# Patient Record
Sex: FEMALE | Race: OTHER | HISPANIC OR LATINO | URBAN - METROPOLITAN AREA
[De-identification: names, ages, dates, MRNs, and addresses within clinical notes are randomized per-mention and may not be internally consistent; named-entity substitution may affect disease eponyms.]

---

## 2023-10-09 ENCOUNTER — EMERGENCY (EMERGENCY)
Facility: HOSPITAL | Age: 28
LOS: 1 days | Discharge: HOME | End: 2023-10-09
Attending: EMERGENCY MEDICINE
Payer: MEDICAID

## 2023-10-09 VITALS
SYSTOLIC BLOOD PRESSURE: 113 MMHG | TEMPERATURE: 98 F | WEIGHT: 165.35 LBS | HEART RATE: 77 BPM | RESPIRATION RATE: 16 BRPM | HEIGHT: 63.78 IN | DIASTOLIC BLOOD PRESSURE: 72 MMHG | OXYGEN SATURATION: 99 %

## 2023-10-09 VITALS
OXYGEN SATURATION: 97 % | RESPIRATION RATE: 16 BRPM | DIASTOLIC BLOOD PRESSURE: 73 MMHG | SYSTOLIC BLOOD PRESSURE: 114 MMHG | HEART RATE: 63 BPM

## 2023-10-09 PROCEDURE — 72100 X-RAY EXAM L-S SPINE 2/3 VWS: CPT | Mod: 26

## 2023-10-09 PROCEDURE — T1013: CPT

## 2023-10-09 PROCEDURE — 99284 EMERGENCY DEPT VISIT MOD MDM: CPT

## 2023-10-09 PROCEDURE — 72100 X-RAY EXAM L-S SPINE 2/3 VWS: CPT

## 2023-10-09 PROCEDURE — 99283 EMERGENCY DEPT VISIT LOW MDM: CPT | Mod: 25

## 2023-10-09 RX ORDER — LIDOCAINE 4 G/100G
1 CREAM TOPICAL ONCE
Refills: 0 | Status: COMPLETED | OUTPATIENT
Start: 2023-10-09 | End: 2023-10-09

## 2023-10-09 RX ORDER — ACETAMINOPHEN 500 MG
975 TABLET ORAL ONCE
Refills: 0 | Status: COMPLETED | OUTPATIENT
Start: 2023-10-09 | End: 2023-10-09

## 2023-10-09 RX ORDER — IBUPROFEN 200 MG
600 TABLET ORAL ONCE
Refills: 0 | Status: COMPLETED | OUTPATIENT
Start: 2023-10-09 | End: 2023-10-09

## 2023-10-09 RX ADMIN — Medication 600 MILLIGRAM(S): at 17:23

## 2023-10-09 RX ADMIN — LIDOCAINE 1 PATCH: 4 CREAM TOPICAL at 18:00

## 2023-10-09 RX ADMIN — LIDOCAINE 1 PATCH: 4 CREAM TOPICAL at 17:24

## 2023-10-09 RX ADMIN — Medication 975 MILLIGRAM(S): at 17:23

## 2023-10-09 NOTE — ED ADULT TRIAGE NOTE - CHIEF COMPLAINT QUOTE
B/L lower back/hip pain x 3 days and numbness/tingling x 4 days in B/L LE, worse on R. side that's aggravated by movement, causing difficulty ambulating and standing

## 2023-10-09 NOTE — ED PROVIDER NOTE - NSFOLLOWUPINSTRUCTIONS_ED_ALL_ED_FT
You were seen here for back pain.    Take Motrin 600 mg every 6 hours for pain. You can take this with Tylenol 650 mg every 4-6 hours for pain. These can be taken together.    Use Lidocaine patches (4%) which can be found at the pharmacy for 12 hours at a time.     Seek immediate medical care:   -numbness/tingling  -peeing or pooping yourself or difficulty peeing/pooping that is new  -if you can't walk despite pain medication   OR IF YOU HAVE ANY NEW/WORSENING CONCERNS.    You will receive a call from a member of our team in the next 24-48 hours to schedule an appointment with the spine center. If you do not hear from them, call the number below.

## 2023-10-09 NOTE — ED PROVIDER NOTE - PHYSICAL EXAMINATION
PE:  Gen: NAD  Head: NCAT  ENT: MMM, Normal conjunctiva  Chest: RRR, normal perfusion, no LE edema   Lungs: Symmetrical chest rise, lungs CTAB  Abdomen: soft, NTND, No rebound/guarding  Ext: No gross deformities  Neuro: awake and alert, 5/5 strength of bilateral lower extremities however pain with flexion of R hip.   MSK: negative SLR  Skin: no rashes

## 2023-10-09 NOTE — ED ADULT NURSE NOTE - OBJECTIVE STATEMENT
28 year old female pt presented to the ED co lower back pain rad down left leg x 4 days pt tool diclofenac with no relief, pt denies any trauma denies any heavy lifting pt denies any period of incontinence, pt denies any numbness or tingling pt able to ambulate but with difficulty 28 year old female pt presented to the ED co lower back pain rad down right leg x 4 days pt tool diclofenac with no relief, pt denies any trauma denies any heavy lifting pt denies any period of incontinence, pt denies any numbness or tingling pt able to ambulate but with difficulty

## 2023-10-09 NOTE — ED PROVIDER NOTE - PROGRESS NOTE DETAILS
Batsheva Gaxiola, Attending Physician: Patient ambulatory with steady gait. Return precautions including but not limited to those listed on discharge instructions were discussed at length and patient felt comfortable going home. All questions answered prior to discharge.

## 2023-10-09 NOTE — ED PROVIDER NOTE - PATIENT PORTAL LINK FT
You can access the FollowMyHealth Patient Portal offered by [facility] by registering at the following website: [email or web address]. By joining [email or web address]’s FollowMyHealth portal, you will also be able to view your health information using other applications (apps) compatible with our system.

## 2023-10-09 NOTE — ED PROVIDER NOTE - OBJECTIVE STATEMENT
28-year-old female recently immigrated from [place] 1 month ago via flight here for right side back pain sees contrary to triage report as patient initially describing it as bilateral but then pointing only to her right side.  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  Is unclear what this was.  It improved with a medication given by her mom which was a combination of diclofenac, betamethasone and B12.  No numbness or tingling at present though she does have intermittent radiation to her right lateral thigh.  It is worse when sitting up from long periods of laying down or standing up from long periods of sitting.  Nothing really makes it better.  Nothing makes it worse.  No urinary or bowel dysfunction.  No IV drug use.  No history of surgeries.  No history of therapeutic intervention for her reported abnormal spine.  No recent trauma.    Of note patient does have intermittent, lower extremity swelling which she has documented pictures of.  It is present bilaterally in both of her ankles however denies at present.  Patient with intermittent chest pain and shortness of breath however known at present.  No orthopnea or dyspnea on exertion.

## 2023-10-09 NOTE — ED PROVIDER NOTE - ATTENDING CONTRIBUTION TO CARE
see mdm    edited by Batsheva Gaxiola DO - attending physician.   Please see progress notes for status/labs/consult updates and ED course after initial presentation.

## 2023-10-09 NOTE — ED PROVIDER NOTE - CLINICAL SUMMARY MEDICAL DECISION MAKING FREE TEXT BOX
Batsheva Gaxiola, Attending Physician: 28-year-old female here for unilateral back pain without red flag symptoms.  Differential diagnosis includes but not limited to: Radiculopathy, sacroiliitis, spinal stenosis.  No clinical evidence of shingles at this time. No clinical evidence of cord compresison at this time.     Patient's lower extremity edema is bilateral and not acute in nature.  Patient is euvolemic at this time and this does not warrant urgent/emergent work-up for these findings.  Will provide referral to PCP

## 2025-07-25 NOTE — ED ADULT NURSE NOTE - NS ED NURSE LEVEL OF CONSCIOUSNESS ORIENTATION
Please continue using your Lidoderm patches and muscle relaxer as previously prescribed.  Continue outpatient follow-up with neurosurgery and pain management as scheduled.  Refer to handout above for emergent reasons to return to ED.  
Oriented - self; Oriented - place; Oriented - time